# Patient Record
Sex: FEMALE | Race: BLACK OR AFRICAN AMERICAN | NOT HISPANIC OR LATINO | Employment: STUDENT | URBAN - METROPOLITAN AREA
[De-identification: names, ages, dates, MRNs, and addresses within clinical notes are randomized per-mention and may not be internally consistent; named-entity substitution may affect disease eponyms.]

---
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
## 2025-04-11 ENCOUNTER — HOSPITAL ENCOUNTER (EMERGENCY)
Facility: HOSPITAL | Age: 20
Discharge: HOME | End: 2025-04-11
Attending: EMERGENCY MEDICINE

## 2025-04-11 VITALS
HEIGHT: 63 IN | DIASTOLIC BLOOD PRESSURE: 80 MMHG | TEMPERATURE: 98 F | HEART RATE: 70 BPM | OXYGEN SATURATION: 100 % | BODY MASS INDEX: 19.49 KG/M2 | RESPIRATION RATE: 16 BRPM | WEIGHT: 110 LBS | SYSTOLIC BLOOD PRESSURE: 120 MMHG

## 2025-04-11 DIAGNOSIS — M26.609 TMJ (TEMPOROMANDIBULAR JOINT DISORDER): ICD-10-CM

## 2025-04-11 DIAGNOSIS — R68.84 JAW PAIN: Primary | ICD-10-CM

## 2025-04-11 LAB
B-HCG UR QL: NEGATIVE
CTP QC/QA: YES

## 2025-04-11 PROCEDURE — 99284 EMERGENCY DEPT VISIT MOD MDM: CPT | Mod: 25

## 2025-04-11 PROCEDURE — 96372 THER/PROPH/DIAG INJ SC/IM: CPT

## 2025-04-11 PROCEDURE — 81025 URINE PREGNANCY TEST: CPT

## 2025-04-11 PROCEDURE — 63600175 PHARM REV CODE 636 W HCPCS: Mod: TB,JZ

## 2025-04-11 RX ORDER — KETOROLAC TROMETHAMINE 30 MG/ML
15 INJECTION, SOLUTION INTRAMUSCULAR; INTRAVENOUS
Status: COMPLETED | OUTPATIENT
Start: 2025-04-11 | End: 2025-04-11

## 2025-04-11 RX ADMIN — KETOROLAC TROMETHAMINE 15 MG: 30 INJECTION, SOLUTION INTRAMUSCULAR; INTRAVENOUS at 05:04

## 2025-04-11 NOTE — ED PROVIDER NOTES
Encounter Date: 4/11/2025       History     Chief Complaint   Patient presents with    Jaw Pain     Pt states she has been having jaw pain x 1 month. States it locks up on her and hurts.      19-year-old female past medical history of asthma presents to the emergency department for 1 month of intermittent jaw pain.  Patient reports that there was no trauma or associated symptoms such as difficulty swallowing, pain with swallowing, fever, neck swelling.  States that occasionally she feels popping and clicking with chewing and that her jaw will lock up and she is unable to open it up fully.  She has taken ibuprofen a couple of times but not consistently.  She has not seen anybody for this problem.  No fevers at home or recent dental procedures.        Review of patient's allergies indicates:   Allergen Reactions    Fish containing products Swelling    Strawberries [strawberry] Swelling     Past Medical History:   Diagnosis Date    Asthma      No past surgical history on file.  Family History   Problem Relation Name Age of Onset    Hypertension Father       Social History[1]  Review of Systems   Constitutional: Negative.    HENT:          Jaw pain   Eyes: Negative.    Respiratory: Negative.     Cardiovascular: Negative.    Gastrointestinal: Negative.    Musculoskeletal: Negative.    Neurological: Negative.        Physical Exam     Initial Vitals   BP Pulse Resp Temp SpO2   04/11/25 1600 04/11/25 1600 04/11/25 1601 04/11/25 1600 04/11/25 1600   123/82 75 18 98.4 °F (36.9 °C) 100 %      MAP       --                Physical Exam    Vitals reviewed.  Constitutional: She appears well-developed and well-nourished. She is not diaphoretic. No distress.   HENT:   Head: Atraumatic.   Right Ear: External ear normal.   Left Ear: External ear normal. Mouth/Throat: Oropharynx is clear and moist. No oropharyngeal exudate.   Jaws not dislocated. Limited ability to fully open the jaw. No clicking or popping on physical exam.  No  swelling, tenderness.  No protrusion or lateral deviation.   Eyes: Conjunctivae and EOM are normal. Pupils are equal, round, and reactive to light. Right eye exhibits no discharge. Left eye exhibits no discharge.   Neck:   No swelling tenderness of the neck   Normal range of motion.  Cardiovascular:  Normal rate, regular rhythm and normal heart sounds.           Pulmonary/Chest: Breath sounds normal. No respiratory distress.   Abdominal: Abdomen is soft.   Musculoskeletal:         General: Normal range of motion.      Cervical back: Normal range of motion.     Lymphadenopathy:     She has no cervical adenopathy.   Neurological: She is alert and oriented to person, place, and time. She has normal strength. No cranial nerve deficit. GCS score is 15. GCS eye subscore is 4. GCS verbal subscore is 5. GCS motor subscore is 6.   Skin: Skin is warm.         ED Course   Procedures  Labs Reviewed   POCT URINE PREGNANCY       Result Value    POC Preg Test, Ur Negative       Acceptable Yes            Imaging Results    None          Medications   ketorolac injection 15 mg (15 mg Intramuscular Given 4/11/25 9681)     Medical Decision Making  19-year-old female presents to the emergency department for intermittent jaw pain x1 month    Considerations include TMJ, dental abscess, teeth grinding, sinusitis, trigeminal neuralgia, osteoarthritis of the jaw, giant cell arteritis, mandibular fracture,, neoplasm    Vitals stable.  Patient afebrile.  Well-appearing on physical exam.  Nontoxic and in no acute distress.  Patient has no tenderness over the jaw.  Jaw is not dislocated.  Without protrusion or lateral deviation.  Limited ability to fully open the jaw.  She has no swelling to the neck, swelling inside of the mouth, elevation of the floor of the mouth.  No clicking or popping felt or heard on physical exam.  She has full range of motion in the neck.  I have low concern for any acute life-threatening illness.   Discussed with the patient the use of a  and follow up with a dentist outpatient.  She was given a shot of Toradol here in the emergency department and informed to continue taking NSAIDs at home.  She was given strict return precautions.  She verbalized her understanding of the plan and agreed.  Plan also discussed with my attending and all questions were answered at the bedside.        Risk  Prescription drug management.                                      Clinical Impression:  Final diagnoses:  [R68.84] Jaw pain (Primary)  [M26.609] TMJ (temporomandibular joint disorder)          ED Disposition Condition    Discharge Stable          ED Prescriptions    None       Follow-up Information       Follow up With Specialties Details Why Contact Info    Jeansonne, Cornel J., MD Pediatrics Call   [address] [facility] [address] [phone]                 [1]   Social History  Tobacco Use    Smoking status: Never   Substance Use Topics    Alcohol use: Never    Drug use: Never        Kaylee Tsai PA-C  04/11/25 8102

## 2025-04-11 NOTE — DISCHARGE INSTRUCTIONS
Please follow up with your dentist outpatient regarding your visit here today.  Please use a mouth guard for teeth grinding until you see a dentist. Continue to use NSAIDS such as ibuprofen and naproxen. Do not use them together.  Return to the emergency department if her symptoms worsen or you develop difficulty or pain with swallowing.

## 2025-07-02 RX ORDER — ALBUTEROL SULFATE 90 UG/1
INHALANT RESPIRATORY (INHALATION)
COMMUNITY

## 2025-07-02 RX ORDER — EPINEPHRINE 0.3 MG/.3ML
INJECTION SUBCUTANEOUS
COMMUNITY

## 2025-07-07 ENCOUNTER — OFFICE VISIT (OUTPATIENT)
Dept: FAMILY MEDICINE | Facility: CLINIC | Age: 20
End: 2025-07-07
Payer: COMMERCIAL

## 2025-07-07 VITALS
DIASTOLIC BLOOD PRESSURE: 74 MMHG | RESPIRATION RATE: 20 BRPM | HEART RATE: 70 BPM | HEIGHT: 63 IN | TEMPERATURE: 98 F | BODY MASS INDEX: 19.49 KG/M2 | WEIGHT: 110 LBS | SYSTOLIC BLOOD PRESSURE: 110 MMHG

## 2025-07-07 DIAGNOSIS — M41.9 SCOLIOSIS OF LUMBOSACRAL SPINE, UNSPECIFIED SCOLIOSIS TYPE: ICD-10-CM

## 2025-07-07 DIAGNOSIS — Z00.00 ROUTINE HEALTH MAINTENANCE: Primary | ICD-10-CM

## 2025-07-07 DIAGNOSIS — N94.6 DYSMENORRHEA: ICD-10-CM

## 2025-07-07 DIAGNOSIS — Z01.419 WELL WOMAN EXAM: ICD-10-CM

## 2025-07-07 PROCEDURE — 99999 PR PBB SHADOW E&M-EST. PATIENT-LVL V: CPT | Mod: PBBFAC,,, | Performed by: NURSE PRACTITIONER

## 2025-07-07 PROCEDURE — 99203 OFFICE O/P NEW LOW 30 MIN: CPT | Mod: S$GLB,,, | Performed by: NURSE PRACTITIONER

## 2025-07-07 NOTE — PROGRESS NOTES
" Patient ID: Derek Morillo is a 19 y.o. female.    Chief Complaint: Establish Care (18 yo female here to establish care with PCP. Pt states c/o lower back pain with hx of scoliosis. KM)    History of Present Illness    CHIEF COMPLAINT:  Ms. Morillo presents for establishment of care and to discuss severe menstrual pain that has been affecting her work and daily life.    HPI:  Ms. Morillo is a very pleasant 18 yo who presents today to establish care with me as her PCP.  She has several issues she would like to discuss at the time of this visit.     Ms. 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 The pain occurs monthly, is so severe that she has to call off work during her periods, and is accompanied by vomiting. Symptoms typically last for the first 2-3 days of her menstrual cycle. Her most recent cycle was from the 1st to the 5th of the current month.    She has attempted lifestyle modifications to alleviate symptoms, such as stopping caffeine, but these changes have not provided relief. She is confined to bed for the entire day due to the pain. When she consulted her previous doctor, she was advised to take ibuprofen, which she feels is inadequate.    Ms. Morillo has scoliosis, which compounds her discomfort during menstruation. 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    Her back pain is severe enough to affect her driving, causing her to lean to one side and not stay centered on the road. She also has discomfort while sitting and needs to lean forward or keep moving to alleviate the back pain.    MEDICATIONS:  Ms. Morillo is on Ibuprofen as needed for menstrual pain. She has discontinued caffeine intake before her menstrual cycle " to alleviate symptoms.    MEDICAL HISTORY:  Ms. Morillo has a history of scoliosis, diagnosed in 2020, and leg length discrepancy. Ms. Morillo receives flu vaccines annually through  health screening. Her other vaccines are also updated annually through the same process. Ms. Morillo's last menstrual period occurred from the 1st to the 5th of this month.    FAMILY HISTORY:  Family history is significant for father with high blood pressure. One grandmother has colon cancer, while her maternal grandmother has diabetes.    IMAGING:  Ms. oMrillo underwent a scoliosis X-ray in 2020, which revealed significant curvature.    SOCIAL HISTORY:  Occupation: [employer]     History: [employer], completed training in 2022      ROS:  General: -fever, -chills, -fatigue, -weight gain, -weight loss  Eyes: -vision changes, -redness, -discharge  ENT: -ear pain, -nasal congestion, -sore throat  Cardiovascular: -chest pain, -palpitations, -lower extremity edema  Respiratory: -cough, -shortness of breath  Gastrointestinal: -abdominal pain, -nausea, +vomiting, -diarrhea, -constipation, -blood in stool  Genitourinary: -dysuria, -hematuria, -frequency  Musculoskeletal: -joint pain, -muscle pain, +back pain, +abnormal gait  Skin: -rash, -lesion  Neurological: -headache, -dizziness, -numbness, -tingling  Psychiatric: -anxiety, -depression, -sleep difficulty  Female Genitourinary: +menstrual pain or symptoms, +excessive vaginal bleeding             Past Medical History:   Diagnosis Date    Asthma     Scoliosis      History reviewed. No pertinent surgical history.      Tobacco History:  reports that she has never smoked. She has never been exposed to tobacco smoke. She has never used smokeless tobacco.      Review of patient's allergies indicates:   Allergen Reactions    Fish containing products Swelling    Iodine Other (See Comments)    Shellfish derived Other (See Comments)    Strawberries [strawberry] Swelling     Current Medications[1]        "    Objective:      Vitals:    07/07/25 1015   BP: 110/74   Pulse: 70   Resp: 20   Temp: 98.4 °F (36.9 °C)   TempSrc: Oral   Weight: 49.9 kg (110 lb 0.2 oz)   Height: 5' 3" (1.6 m)     Physical Exam  Constitutional:       Appearance: Normal appearance.   Cardiovascular:      Rate and Rhythm: Normal rate and regular rhythm.   Musculoskeletal:      Thoracic back: Scoliosis present.      Lumbar back: Scoliosis present.   Skin:     General: Skin is warm.   Neurological:      Mental Status: She is alert and oriented to person, place, and time. Mental status is at baseline.   Psychiatric:         Mood and Affect: Mood normal.         Behavior: Behavior normal.           Assessment:       1. Routine health maintenance    2. Dysmenorrhea    3. Well woman exam    4. Scoliosis of lumbosacral spine, unspecified scoliosis type           Plan:       Assessment & Plan    N94.3 Premenstrual tension syndrome  M41.9 Scoliosis, unspecified  M21.759 Unequal limb length (acquired), unspecified femur  M54.50 Low back pain, unspecified  Z83.3 Family history of diabetes mellitus    ## PREMENSTRUAL SYNDROME   - Ms. Morillo experiences severe pain during menstrual cycles, including vomiting and cramps, lasting from the first to third day, with heavy flow.  - Lifestyle modifications such as caffeine avoidance have not improved symptoms.  - Suspect endometriosis based on reported symptoms of significant menstrual pain, vomiting, and inability to work during periods.  - Explained that endometriosis diagnosis requires laparoscopic surgery for definitive diagnosis and discussed potential genetic component, though not scientifically proven.  - Referred to gynecologist for further evaluation and management of menstrual pain and suspected endometriosis.    ## SCOLIOSIS AND BACK PAIN:  - Ms. Morillo reports scoliosis causing significant back pain, affecting posture and daily activities.  - Condition has worsened due to  training, including " carrying heavy loads and poor seating conditions.  - Ms. Morillo mentions leg length discrepancy as a contributing factor.  - Ordered XR Spine (scoliosis study) to assess progression of the condition.  - Referred to orthopedist for comprehensive assessment and management of scoliosis.    ## FAMILY HISTORY OF DIABETES:  - Ms. Morillo reports family history of diabetes on mother's side.  - Ordered CMP to screen for diabetes and assess kidney and liver function.  - Also ordered cholesterol screening and lipid panel.    ## GENERAL HEALTH SCREENING:  - Ordered Hepatitis C and HIV screening.    ## FOLLOW-UP:  - Send screenshot of  vaccine records through patient portal for updating medical record.  - Contact the office if patient does not hear from referral offices by the end of the week.  - Send any questions through the patient portal if needed.         Routine health maintenance  -     HEPATITIS C ANTIBODY; Future; Expected date: 07/07/2025  -     LIPID PANEL; Future; Expected date: 07/07/2025  -     HIV 1/2 Ag/Ab (4th Gen); Future; Expected date: 07/07/2025  -     CBC Auto Differential; Future; Expected date: 07/07/2025  -     Comprehensive Metabolic Panel; Future; Expected date: 07/07/2025  -     C. trachomatis/N. gonorrhoeae by AMP DNA; Future; Expected date: 07/07/2025    Dysmenorrhea  -     Ambulatory referral/consult to Obstetrics / Gynecology; Future; Expected date: 07/14/2025    Well woman exam  -     Ambulatory referral/consult to Obstetrics / Gynecology; Future; Expected date: 07/14/2025    Scoliosis of lumbosacral spine, unspecified scoliosis type  -     Ambulatory referral/consult to Orthopedics; Future; Expected date: 07/14/2025  -     X-Ray Scoliosis Complete; Future; Expected date: 07/07/2025      Follow up in about 1 year (around 7/7/2026), or if symptoms worsen or fail to improve.        7/7/2025 Stephanie Hawk NP    This note was generated with the assistance of [employer] listening technology.  Verbal consent was obtained by the patient and accompanying visitor(s) for the recording of patient appointment to facilitate this note. I attest to having reviewed and edited the generated note for accuracy, though some syntax or spelling errors may persist. Please contact the author of this note for any clarification.             [1]   Current Outpatient Medications:     albuterol (PROVENTIL/VENTOLIN HFA) 90 mcg/actuation inhaler, INHALE 2 PUFFS INTO THE LUNGS EVERY 4-6 HOURS AS NEEDED, Disp: , Rfl:     EPINEPHrine (EPIPEN) 0.3 mg/0.3 mL AtIn, INJECT 1 PEN IN THE MUSCLE ONE TIME AS DIRECTED, Disp: , Rfl:

## 2025-07-08 ENCOUNTER — HOSPITAL ENCOUNTER (OUTPATIENT)
Dept: RADIOLOGY | Facility: HOSPITAL | Age: 20
Discharge: HOME | End: 2025-07-08
Attending: NURSE PRACTITIONER
Payer: COMMERCIAL

## 2025-07-08 DIAGNOSIS — M41.9 SCOLIOSIS OF LUMBOSACRAL SPINE, UNSPECIFIED SCOLIOSIS TYPE: ICD-10-CM

## 2025-07-08 PROCEDURE — 72082 X-RAY EXAM ENTIRE SPI 2/3 VW: CPT | Mod: 26,,, | Performed by: RADIOLOGY

## 2025-07-08 PROCEDURE — 72082 X-RAY EXAM ENTIRE SPI 2/3 VW: CPT | Mod: TC

## 2025-07-14 ENCOUNTER — OFFICE VISIT (OUTPATIENT)
Dept: ORTHOPEDICS | Facility: CLINIC | Age: 20
End: 2025-07-14
Payer: COMMERCIAL

## 2025-07-14 VITALS — BODY MASS INDEX: 19.49 KG/M2 | WEIGHT: 110 LBS | HEIGHT: 63 IN

## 2025-07-14 DIAGNOSIS — M47.816 LUMBAR FACET JOINT SYNDROME: Primary | ICD-10-CM

## 2025-07-14 DIAGNOSIS — M41.9 SCOLIOSIS OF LUMBOSACRAL SPINE, UNSPECIFIED SCOLIOSIS TYPE: ICD-10-CM

## 2025-07-14 PROCEDURE — 99203 OFFICE O/P NEW LOW 30 MIN: CPT | Mod: S$GLB,,, | Performed by: ORTHOPAEDIC SURGERY

## 2025-07-14 PROCEDURE — 99999 PR PBB SHADOW E&M-EST. PATIENT-LVL III: CPT | Mod: PBBFAC,,, | Performed by: ORTHOPAEDIC SURGERY

## 2025-07-14 RX ORDER — MELOXICAM 7.5 MG/1
7.5 TABLET ORAL 2 TIMES DAILY
Qty: 60 TABLET | Refills: 2 | Status: SHIPPED | OUTPATIENT
Start: 2025-07-14

## 2025-07-14 NOTE — PROGRESS NOTES
Subjective:       Patient ID: Derek Morillo is a 19 y.o. female.    Chief Complaint: Pain of the Lumbar Spine (Scoliosis of lumbosacral spine. Low back pain x many years, increased within the past 3 years after  training/BCT. Limited standing, bending. Pain is worse with activity, wakes from sleep. Pain is throbbing and sharp, moderate in nature. Relief with rest and heat. Pain is constant and non-daily.  )      History of Present Illness    Prior to meeting with the patient I reviewed the medical chart in [facility]. This included reviewing the previous progress notes from our office, review of the patient's last appointment with their primary care provider, review of any visits to the emergency room, and review of any pain management appointments or procedures.   Long history of scoliosis and chronic back pain no radiation to the legs whatsoever same symptoms on a persistent basis has not had any treatment whatsoever    Current Medications  Current Medications[1]    Allergies  Review of patient's allergies indicates:   Allergen Reactions    Fish containing products Swelling    Iodine Other (See Comments)    Shellfish derived Other (See Comments)    Strawberries [strawberry] Swelling       Past Medical History  Past Medical History:   Diagnosis Date    Asthma     Scoliosis        Surgical History  History reviewed. No pertinent surgical history.    Family History:   Family History   Problem Relation Name Age of Onset    Hypertension Father         Social History:   Social History[2]    Hospitalization/Major Diagnostic Procedure:     Review of Systems     General/Constitutional:  Chills denies. Fatigue denies. Fever denies. Weight gain denies. Weight loss denies.    Respiratory:  Shortness of breath denies.    Cardiovascular:  Chest pain denies.    Gastrointestinal:  Constipation denies. Diarrhea denies. Nausea denies. Vomiting denies.     Hematology:  Easy bruising denies. Prolonged bleeding denies.  "    Genitourinary:  Frequent urination denies. Pain in lower back denies. Painful urination denies.     Musculoskeletal:  See HPI for details    Skin:  Rash denies.    Neurologic:  Dizziness denies. Gait abnormalities denies. Seizures denies. Tingling/Numbess denies.    Psychiatric:  Anxiety denies. Depressed mood denies.     Objective:   Vital Signs: There were no vitals filed for this visit.     Physical Exam      General Examination:     Constitutional: The patient is alert and oriented to lace person and time. Mood is pleasant.     Head/Face: Normal facial features normal eyebrows    Eyes: Normal extraocular motion bilaterally    Lungs: Respirations are equal and unlabored    Gait is coordinated.    Cardiovascular: There are no swelling or varicosities present.    Lymphatic: Negative for adenopathy    Skin: Normal    Neurological: Level of consciousness normal. Oriented to place person and time and situation    Psychiatric: Oriented to time place person and situation    Mild thoracolumbar scoliosis visible no rib hump mild tenderness paraspinous muscles L3-S1 no spasm normal range of motion with endpoint pain neurovascular status normal straight-leg-raising negative    XRAY Report/ Interpretation :  Scoliosis view of the spine dated July 8, 2025 was reviewed showing a left thoracic and right lumbar scoliosis.  No evidence of spondylolisthesis no acute changes      Assessment:       1. Lumbar facet joint syndrome    2. Scoliosis of lumbosacral spine, unspecified scoliosis type        Plan:       Derek Mchugh" was seen today for pain.    Diagnoses and all orders for this visit:    Lumbar facet joint syndrome  -     Ambulatory Referral/Consult to Physical Therapy; Future  -     meloxicam (MOBIC) 7.5 MG tablet; Take 1 tablet (7.5 mg total) by mouth 2 (two) times a day.    Scoliosis of lumbosacral spine, unspecified scoliosis type  -     Ambulatory referral/consult to Orthopedics  -     Ambulatory Referral/Consult to " Physical Therapy; Future  -     meloxicam (MOBIC) 7.5 MG tablet; Take 1 tablet (7.5 mg total) by mouth 2 (two) times a day.         Follow up for 6 week f/u - lumbar pain, PT f/u.    Chronic back pain due to scoliosis without radiculopathy treatment options were discussed patient referred to physical therapy and will be started on meloxicam return 6 weeks if not relieved consider MRI and possible pain management referral.  The natural history of condition was discussed in detail today.      Treatment options were discussed with regards to the nature of the medical condition. Conservative pain intervention and surgical options were discussed in detail. The probability of success of each separate treatment option was discussed. The patient expressed a clear understanding of the treatment options. With regards to surgery, the procedure risk, benefits, complications, and outcomes were discussed. No guarantees were given with regards to surgical outcome.   The risk of complications, morbidity, and mortality of patient management decisions have been made at the time of this visit. These are associated with the patient's problems, diagnostic procedures and treatment options. This includes the possible management options selected and those considered but not selected by the patient after shared medical decision making we discussed with the patient.     This note was created using Dragon voice recognition software that occasionally misinterpreted phrases or words.           [1]   Current Outpatient Medications   Medication Sig Dispense Refill    albuterol (PROVENTIL/VENTOLIN HFA) 90 mcg/actuation inhaler INHALE 2 PUFFS INTO THE LUNGS EVERY 4-6 HOURS AS NEEDED      EPINEPHrine (EPIPEN) 0.3 mg/0.3 mL AtIn INJECT 1 PEN IN THE MUSCLE ONE TIME AS DIRECTED      meloxicam (MOBIC) 7.5 MG tablet Take 1 tablet (7.5 mg total) by mouth 2 (two) times a day. 60 tablet 2     No current facility-administered medications for this visit.    [2]   Social History  Socioeconomic History    Marital status: Single   Tobacco Use    Smoking status: Never     Passive exposure: Never    Smokeless tobacco: Never   Substance and Sexual Activity    Alcohol use: Never    Drug use: Never    Sexual activity: Never   Social History Narrative    Patient lives with mom and dad    2 sisters    2 dogs, 1 cat, 1 fish    No smokers    Going into 10th grade [employer]

## 2025-07-21 ENCOUNTER — CLINICAL SUPPORT (OUTPATIENT)
Dept: REHABILITATION | Facility: HOSPITAL | Age: 20
End: 2025-07-21
Payer: COMMERCIAL

## 2025-07-21 DIAGNOSIS — M47.816 LUMBAR FACET JOINT SYNDROME: ICD-10-CM

## 2025-07-21 DIAGNOSIS — R26.9 GAIT ABNORMALITY: ICD-10-CM

## 2025-07-21 DIAGNOSIS — R53.1 STRENGTH LOSS OF: Primary | ICD-10-CM

## 2025-07-21 DIAGNOSIS — M41.9 SCOLIOSIS OF LUMBOSACRAL SPINE, UNSPECIFIED SCOLIOSIS TYPE: ICD-10-CM

## 2025-07-21 PROCEDURE — 97530 THERAPEUTIC ACTIVITIES: CPT | Mod: PN

## 2025-07-21 PROCEDURE — 97161 PT EVAL LOW COMPLEX 20 MIN: CPT | Mod: PN

## 2025-07-21 PROCEDURE — 97112 NEUROMUSCULAR REEDUCATION: CPT | Mod: PN

## 2025-07-23 PROBLEM — R26.9 GAIT ABNORMALITY: Status: ACTIVE | Noted: 2025-07-23

## 2025-07-23 PROBLEM — R53.1 STRENGTH LOSS OF: Status: ACTIVE | Noted: 2025-07-23

## 2025-07-23 NOTE — PROGRESS NOTES
Outpatient Rehab    Physical Therapy Evaluation    Patient Name: Page Morillo  MRN: [id number]  YOB: 2005  Encounter Date: 7/21/2025    Therapy Diagnosis:   Encounter Diagnoses   Name Primary?    Scoliosis of lumbosacral spine, unspecified scoliosis type     Lumbar facet joint syndrome     Strength loss of Yes    Gait abnormality      Physician: Anders Montanez MD    Physician Orders: Eval and Treat  Medical Diagnosis: Scoliosis of lumbosacral spine, unspecified scoliosis type  Lumbar facet joint syndrome  Surgical Diagnosis: Not applicable for this Episode   Surgical Date: Not applicable for this Episode  Days Since Last Surgery: Not applicable for this Episode    Visit # / Visits Authorized:  1 / 1  Insurance Authorization Period: 7/14/2025 to 7/14/2026  Date of Evaluation: 7/21/2025  Plan of Care Certification: 7/21/2025 to 10/13/2025     Time In: 0900   Time Out: 0950  Total Time (in minutes): 50   Total Billable Time (in minutes): 50    Intake Outcome Measure for FOTO Survey    Therapist reviewed FOTO scores for Page Morillo on 7/21/2025.   FOTO report - see Media section or FOTO account episode details.     Intake Score (%): 76    Precautions:       Subjective   History of Present Illness  Page is a 19 y.o. female who reports to physical therapy with a chief concern of Chronic low back pain.                 History of Present Condition/Illness: She has scoliosis which causes her to have back pain. Her low back pain started in [employer] and worsened when she joined the [employer] 3 years ago. Her pain is also worst when she is on her periods. She has to ride in [employer] vehicles with metal seats. She feels limited in what she can lift at work which increases her pain as well. Little things can aggravate it. She is also starting school soon.     Pain     Patient reports a current pain level of 0/10. Pain at best is reported as 0/10. Pain at worst is reported as 6/10.   Location: Lower  back  Clinical Progression (since onset): Worsening  Pain Qualities: Aching, Sharp  Pain-Relieving Factors: Stretching, Medications - over-the-counter  Pain-Aggravating Factors: Standing, Sitting           Past Medical History/Physical Systems Review:   Derek Morillo  has a past medical history of Asthma and Scoliosis.    Derek Morillo  has no past surgical history on file.    Derek has a current medication list which includes the following prescription(s): albuterol, epinephrine, and meloxicam.    Review of patient's allergies indicates:   Allergen Reactions    Fish containing products Swelling    Iodine Other (See Comments)    Shellfish derived Other (See Comments)    Strawberries [strawberry] Swelling        Objective      Lumbar Range of Motion   Range of motion WNL, feels tight with extension                  Hip Strength - Planes of Motion   Right Strength Right Pain Left Strength Left  Pain   Flexion (L2) 4+   4+     Extension           ABduction 3+   3+     ADduction           Internal Rotation 3+   3+     External Rotation 3+   3+         Knee Strength   Right Strength Right Pain Left Strength Left  Pain   Flexion (S2) 4   4     Prone Flexion           Extension (L3) 4   4                Lumbar/Pelvic Girdle Special Tests            Other Lumbar Tests  Positive: Left Quadrant  Negative: Right Quadrant                     Four Stage Balance Test                 Single Leg Stand - Right Foot: 10 sec  Single Leg Stand - Left Foot: 10 sec       Unremarkable Mobility Test Results  Unremarkable: Squat     Gait Analysis  Hip Observations During Gait  Bilateral: Hip Trendelenburg         Treatment:  Balance/Neuromuscular Re-Education  NMR 1: Open books x 20  NMR 2: Side lying clamshell x 15 bilateral  NMR 3: Paloff press x 15 bilateral  NMR 4: Bridge wtih kick out x 15 bilateral  Therapeutic Activity  TA 1: Education on condition and HEP    Time Entry(in minutes):  PT Evaluation (Low) Time Entry: 30  Neuromuscular  Re-Education Time Entry: 10  Therapeutic Activity Time Entry: 10    Assessment & Plan   Assessment  Page presents with a condition of Low complexity.   Presentation of Symptoms: Stable       Functional Limitations: Activity tolerance, Completing work/school activities, Decreased ambulation distance/endurance, Functional mobility, Range of motion, Pain with ADLs/IADLs, Participating in leisure activities  Impairments: Abnormal gait, Activity intolerance, Impaired physical strength, Lack of appropriate home exercise program, Pain with functional activity  Personal Factors Affecting Prognosis: Pain, Physical limitations    Patient Goal for Therapy (PT): have less pain with work and ADLs  Prognosis: Fair  Assessment Details: Page is a 19 year old female with medical diagnosis of scoliosis. She presents to clinic today with history of chronic low back pain worse the past few years where she has been working in the . She presents wit gait impairments, Lower Extremity weakness, discomfort with range of motion, and functional limitations of pain with lifting and work.     Plan  From a physical therapy perspective, the patient would benefit from: Skilled Rehab Services    Planned therapy interventions include: Therapeutic exercise, Therapeutic activities, Neuromuscular re-education, and Manual therapy.            Visit Frequency: 2 times Per Week for 12 Weeks.       This plan was discussed with Patient.   Discussion participants: Agreed Upon Plan of Care             The patient's spiritual, cultural, and educational needs were considered, and the patient is agreeable to the plan of care and goals.           Goals:   Active       Long term goals        patient goal: have less pain with work and ADLs       Start:  07/21/25    Expected End:  10/13/25            patient will have improved FOTO score to predicted level to show true functional improvements       Start:  07/21/25    Expected End:  10/13/25             patient will be independent in progressed Home Exercise Program to improve functional strength       Start:  07/21/25    Expected End:  10/13/25               Short term goals       patient will be independent in Home Exercise Program to improve strength       Start:  07/21/25    Expected End:  09/01/25            patient will have no pain with lumbar extension        Start:  07/21/25    Expected End:  09/01/25            patient will have improved lateral glute strength by 1/3 MMT        Start:  07/21/25    Expected End:  09/01/25                Tayla Moreira, PT, DPT  Board Certified Clinical Specialist in Orthopedic Physical Therapy

## 2025-07-24 ENCOUNTER — CLINICAL SUPPORT (OUTPATIENT)
Dept: REHABILITATION | Facility: HOSPITAL | Age: 20
End: 2025-07-24
Payer: COMMERCIAL

## 2025-07-24 DIAGNOSIS — R26.9 GAIT ABNORMALITY: ICD-10-CM

## 2025-07-24 DIAGNOSIS — R53.1 STRENGTH LOSS OF: Primary | ICD-10-CM

## 2025-07-24 PROCEDURE — 97112 NEUROMUSCULAR REEDUCATION: CPT | Mod: PN

## 2025-07-24 PROCEDURE — 97530 THERAPEUTIC ACTIVITIES: CPT | Mod: PN

## 2025-07-25 NOTE — PROGRESS NOTES
"  Outpatient Rehab    Physical Therapy Visit    Patient Name: Page Morillo  MRN: [id number]  YOB: 2005  Encounter Date: 7/24/2025    Therapy Diagnosis:   Encounter Diagnoses   Name Primary?    Strength loss of Yes    Gait abnormality      Physician: Anders Montanez MD    Physician Orders: Eval and Treat  Medical Diagnosis: Scoliosis of lumbosacral spine, unspecified scoliosis type  Lumbar facet joint syndrome  Surgical Diagnosis: Not applicable for this Episode   Surgical Date: Not applicable for this Episode  Days Since Last Surgery: Not applicable for this Episode    Visit # / Visits Authorized:  1 / 10  Insurance Authorization Period: 7/16/2025 to 12/31/2025  Date of Evaluation: 7/21/2025  Plan of Care Certification: 7/23/2025 to 10/13/2025      PT/PTA:     Number of PTA visits since last PT visit:   Time In: 1415   Time Out: 1455  Total Time (in minutes): 40   Total Billable Time (in minutes): 40    FOTO:  Intake Score (%): 76  Survey Score 2 (%): Not applicable for this Episode  Survey Score 3 (%): Not applicable for this Episode    Precautions:         Subjective   She was hurting yesterday because she drove to [facility] and back. Today it is fine..  Pain reported as 0/10.      Objective            Treatment:  Balance/Neuromuscular Re-Education  NMR 1: Open books x 20  NMR 2: Side lying clamshell 2 x 15 bilateral red band  NMR 3: Paloff press 2 x 15 bilateral green band  NMR 4: Bridge wtih kick out 2 x 15 bilateral  Therapeutic Activity  TA 1: Side plank 30" x 3 bilateral  TA 2: Nustep 10' level 3 for activity tolerance  TA 3: SLR 2#, 2 x 15 bilateral    Time Entry(in minutes):  Neuromuscular Re-Education Time Entry: 23  Therapeutic Activity Time Entry: 17    Assessment & Plan   Assessment: Page presented with no pain at beginning of session. She did well with interventions and tolerated progressions well. Will continue to progress as tolerated per plan of care.   Evaluation/Treatment Tolerance: " Patient tolerated treatment well    The patient will continue to benefit from skilled outpatient physical therapy in order to address the deficits listed in the problem list on the initial evaluation, provide patient and family education, and maximize the patients level of independence in the home and community environments.     The patient's spiritual, cultural, and educational needs were considered, and the patient is agreeable to the plan of care and goals.           Plan: Progress as tolerated per plan of care.    Goals:   Active       Long term goals        patient goal: have less pain with work and ADLs (Progressing)       Start:  07/21/25    Expected End:  10/13/25            patient will have improved FOTO score to predicted level to show true functional improvements (Progressing)       Start:  07/21/25    Expected End:  10/13/25            patient will be independent in progressed Home Exercise Program to improve functional strength (Progressing)       Start:  07/21/25    Expected End:  10/13/25               Short term goals       patient will be independent in Home Exercise Program to improve strength (Progressing)       Start:  07/21/25    Expected End:  09/01/25            patient will have no pain with lumbar extension  (Progressing)       Start:  07/21/25    Expected End:  09/01/25            patient will have improved lateral glute strength by 1/3 MMT  (Progressing)       Start:  07/21/25    Expected End:  09/01/25                Tayla Moreira, PT, DPT  Board Certified Clinical Specialist in Orthopedic Physical Therapy

## 2025-07-31 ENCOUNTER — CLINICAL SUPPORT (OUTPATIENT)
Dept: REHABILITATION | Facility: HOSPITAL | Age: 20
End: 2025-07-31
Payer: COMMERCIAL

## 2025-07-31 DIAGNOSIS — R53.1 STRENGTH LOSS OF: Primary | ICD-10-CM

## 2025-07-31 DIAGNOSIS — R26.9 GAIT ABNORMALITY: ICD-10-CM

## 2025-07-31 PROCEDURE — 97112 NEUROMUSCULAR REEDUCATION: CPT | Mod: PN

## 2025-07-31 PROCEDURE — 97530 THERAPEUTIC ACTIVITIES: CPT | Mod: PN

## 2025-07-31 NOTE — PROGRESS NOTES
"  Outpatient Rehab    Physical Therapy Visit    Patient Name: Page Morillo  MRN: [id number]  YOB: 2005  Encounter Date: 7/31/2025    Therapy Diagnosis:   Encounter Diagnoses   Name Primary?    Strength loss of Yes    Gait abnormality      Physician: Anders Montanez MD    Physician Orders: Eval and Treat  Medical Diagnosis: Scoliosis of lumbosacral spine, unspecified scoliosis type  Lumbar facet joint syndrome  Surgical Diagnosis: Not applicable for this Episode   Surgical Date: Not applicable for this Episode  Days Since Last Surgery: Not applicable for this Episode    Visit # / Visits Authorized:  2 / 10  Insurance Authorization Period: 7/16/2025 to 12/31/2025  Date of Evaluation: 7/21/2025  Plan of Care Certification: 7/23/2025 to 10/13/2025      PT/PTA:     Number of PTA visits since last PT visit:   Time In: 1600   Time Out: 1653  Total Time (in minutes): 53   Total Billable Time (in minutes): 53    FOTO:  Intake Score (%): 76  Survey Score 2 (%): Not applicable for this Episode  Survey Score 3 (%): Not applicable for this Episode    Precautions:         Subjective   Feeling okay, back hurts some days and not others..  Pain reported as 0/10.      Objective            Treatment:  Balance/Neuromuscular Re-Education  NMR 1: Open books x 20  NMR 2: Side lying clamshell 2 x 15 bilateral red band  NMR 3: Paloff press and lift 3 x 15 bilateral green band  NMR 4: Bridge wtih kick out 2 x 15 bilateral  NMR 5: Supine swiss ball core contraction 3" x 30  Therapeutic Activity  TA 1: Side plank 45" x 3 bilateral  TA 2: Nustep 10' level 3 for activity tolerance  TA 3: SLR 2#, 2 x 15 bilateral  TA 4: Lateral band walks red band 3 x 10 yard laps    Time Entry(in minutes):  Neuromuscular Re-Education Time Entry: 30  Therapeutic Activity Time Entry: 23    Assessment & Plan   Assessment: Page presented to clinic with no pain today. We progressed interventions and included lateral band walking. She was fatigued at end " of session but tolerated well.   Evaluation/Treatment Tolerance: Patient tolerated treatment well    The patient will continue to benefit from skilled outpatient physical therapy in order to address the deficits listed in the problem list on the initial evaluation, provide patient and family education, and maximize the patients level of independence in the home and community environments.     The patient's spiritual, cultural, and educational needs were considered, and the patient is agreeable to the plan of care and goals.           Plan: Progress as tolerated per plan of care.    Goals:   Active       Long term goals        patient goal: have less pain with work and ADLs (Progressing)       Start:  07/21/25    Expected End:  10/13/25            patient will have improved FOTO score to predicted level to show true functional improvements (Progressing)       Start:  07/21/25    Expected End:  10/13/25            patient will be independent in progressed Home Exercise Program to improve functional strength (Progressing)       Start:  07/21/25    Expected End:  10/13/25               Short term goals       patient will be independent in Home Exercise Program to improve strength (Progressing)       Start:  07/21/25    Expected End:  09/01/25            patient will have no pain with lumbar extension  (Progressing)       Start:  07/21/25    Expected End:  09/01/25            patient will have improved lateral glute strength by 1/3 MMT  (Progressing)       Start:  07/21/25    Expected End:  09/01/25                Tayla Moreira, PT, DPT  Board Certified Clinical Specialist in Orthopedic Physical Therapy

## 2025-08-14 ENCOUNTER — CLINICAL SUPPORT (OUTPATIENT)
Dept: REHABILITATION | Facility: HOSPITAL | Age: 20
End: 2025-08-14
Payer: COMMERCIAL

## 2025-08-14 DIAGNOSIS — R53.1 STRENGTH LOSS OF: Primary | ICD-10-CM

## 2025-08-14 DIAGNOSIS — R26.9 GAIT ABNORMALITY: ICD-10-CM

## 2025-08-14 PROCEDURE — 97112 NEUROMUSCULAR REEDUCATION: CPT | Mod: PN

## 2025-08-14 PROCEDURE — 97530 THERAPEUTIC ACTIVITIES: CPT | Mod: PN

## 2025-08-21 ENCOUNTER — CLINICAL SUPPORT (OUTPATIENT)
Dept: REHABILITATION | Facility: HOSPITAL | Age: 20
End: 2025-08-21
Payer: COMMERCIAL

## 2025-08-21 DIAGNOSIS — R26.9 GAIT ABNORMALITY: ICD-10-CM

## 2025-08-21 DIAGNOSIS — R53.1 STRENGTH LOSS OF: Primary | ICD-10-CM

## 2025-08-21 PROCEDURE — 97112 NEUROMUSCULAR REEDUCATION: CPT | Mod: PN

## 2025-08-21 PROCEDURE — 97530 THERAPEUTIC ACTIVITIES: CPT | Mod: PN

## 2025-09-05 ENCOUNTER — OFFICE VISIT (OUTPATIENT)
Dept: OBSTETRICS AND GYNECOLOGY | Facility: CLINIC | Age: 20
End: 2025-09-05
Payer: COMMERCIAL

## 2025-09-05 ENCOUNTER — TELEPHONE (OUTPATIENT)
Dept: OBSTETRICS AND GYNECOLOGY | Facility: CLINIC | Age: 20
End: 2025-09-05
Payer: COMMERCIAL

## 2025-09-05 VITALS
SYSTOLIC BLOOD PRESSURE: 110 MMHG | HEIGHT: 63 IN | BODY MASS INDEX: 19.82 KG/M2 | DIASTOLIC BLOOD PRESSURE: 60 MMHG | WEIGHT: 111.88 LBS

## 2025-09-05 DIAGNOSIS — Z01.419 WELL WOMAN EXAM: Primary | ICD-10-CM

## 2025-09-05 DIAGNOSIS — N92.0 MENORRHAGIA WITH REGULAR CYCLE: ICD-10-CM

## 2025-09-05 DIAGNOSIS — N94.6 DYSMENORRHEA: ICD-10-CM

## 2025-09-05 PROCEDURE — 99999 PR PBB SHADOW E&M-EST. PATIENT-LVL III: CPT | Mod: PBBFAC,,, | Performed by: GENERAL PRACTICE
